# Patient Record
Sex: MALE | Race: WHITE | Employment: FULL TIME | URBAN - METROPOLITAN AREA
[De-identification: names, ages, dates, MRNs, and addresses within clinical notes are randomized per-mention and may not be internally consistent; named-entity substitution may affect disease eponyms.]

---

## 2022-12-20 ENCOUNTER — HOSPITAL ENCOUNTER (EMERGENCY)
Age: 37
Discharge: HOME | End: 2022-12-20
Attending: EMERGENCY MEDICINE
Payer: COMMERCIAL

## 2022-12-20 ENCOUNTER — APPOINTMENT (OUTPATIENT)
Dept: GENERAL RADIOLOGY | Age: 37
End: 2022-12-20
Payer: COMMERCIAL

## 2022-12-20 VITALS
HEIGHT: 70 IN | DIASTOLIC BLOOD PRESSURE: 93 MMHG | SYSTOLIC BLOOD PRESSURE: 131 MMHG | HEART RATE: 69 BPM | WEIGHT: 185 LBS | OXYGEN SATURATION: 96 % | BODY MASS INDEX: 26.48 KG/M2 | TEMPERATURE: 98.3 F | RESPIRATION RATE: 13 BRPM

## 2022-12-20 DIAGNOSIS — R07.89 ATYPICAL CHEST PAIN: Primary | ICD-10-CM

## 2022-12-20 DIAGNOSIS — R10.13 DYSPEPSIA: ICD-10-CM

## 2022-12-20 LAB
A/G RATIO: 1.6 (ref 1.1–2.2)
ALBUMIN SERPL-MCNC: 4.7 G/DL (ref 3.4–5)
ALP BLD-CCNC: 75 U/L (ref 40–129)
ALT SERPL-CCNC: 26 U/L (ref 10–40)
ANION GAP SERPL CALCULATED.3IONS-SCNC: 9 MMOL/L (ref 3–16)
AST SERPL-CCNC: 18 U/L (ref 15–37)
BASOPHILS ABSOLUTE: 0 K/UL (ref 0–0.2)
BASOPHILS RELATIVE PERCENT: 0.5 %
BILIRUB SERPL-MCNC: 0.5 MG/DL (ref 0–1)
BUN BLDV-MCNC: 13 MG/DL (ref 7–20)
CALCIUM SERPL-MCNC: 10 MG/DL (ref 8.3–10.6)
CHLORIDE BLD-SCNC: 98 MMOL/L (ref 99–110)
CO2: 29 MMOL/L (ref 21–32)
CREAT SERPL-MCNC: 1.1 MG/DL (ref 0.9–1.3)
EKG ATRIAL RATE: 63 BPM
EKG DIAGNOSIS: NORMAL
EKG P AXIS: 39 DEGREES
EKG P-R INTERVAL: 178 MS
EKG Q-T INTERVAL: 382 MS
EKG QRS DURATION: 88 MS
EKG QTC CALCULATION (BAZETT): 390 MS
EKG R AXIS: 32 DEGREES
EKG T AXIS: 21 DEGREES
EKG VENTRICULAR RATE: 63 BPM
EOSINOPHILS ABSOLUTE: 0.1 K/UL (ref 0–0.6)
EOSINOPHILS RELATIVE PERCENT: 1.5 %
GFR SERPL CREATININE-BSD FRML MDRD: >60 ML/MIN/{1.73_M2}
GLUCOSE BLD-MCNC: 103 MG/DL (ref 70–99)
HCT VFR BLD CALC: 42.4 % (ref 40.5–52.5)
HEMOGLOBIN: 14.4 G/DL (ref 13.5–17.5)
LYMPHOCYTES ABSOLUTE: 2.7 K/UL (ref 1–5.1)
LYMPHOCYTES RELATIVE PERCENT: 30.8 %
MCH RBC QN AUTO: 29.5 PG (ref 26–34)
MCHC RBC AUTO-ENTMCNC: 34 G/DL (ref 31–36)
MCV RBC AUTO: 86.6 FL (ref 80–100)
MONOCYTES ABSOLUTE: 0.7 K/UL (ref 0–1.3)
MONOCYTES RELATIVE PERCENT: 7.5 %
NEUTROPHILS ABSOLUTE: 5.2 K/UL (ref 1.7–7.7)
NEUTROPHILS RELATIVE PERCENT: 59.7 %
PDW BLD-RTO: 13 % (ref 12.4–15.4)
PLATELET # BLD: 246 K/UL (ref 135–450)
PMV BLD AUTO: 8.4 FL (ref 5–10.5)
POTASSIUM REFLEX MAGNESIUM: 4 MMOL/L (ref 3.5–5.1)
RBC # BLD: 4.9 M/UL (ref 4.2–5.9)
SODIUM BLD-SCNC: 136 MMOL/L (ref 136–145)
TOTAL PROTEIN: 7.7 G/DL (ref 6.4–8.2)
TROPONIN: <0.01 NG/ML
WBC # BLD: 8.7 K/UL (ref 4–11)

## 2022-12-20 PROCEDURE — 99285 EMERGENCY DEPT VISIT HI MDM: CPT

## 2022-12-20 PROCEDURE — 93010 ELECTROCARDIOGRAM REPORT: CPT | Performed by: INTERNAL MEDICINE

## 2022-12-20 PROCEDURE — 84484 ASSAY OF TROPONIN QUANT: CPT

## 2022-12-20 PROCEDURE — 6370000000 HC RX 637 (ALT 250 FOR IP): Performed by: EMERGENCY MEDICINE

## 2022-12-20 PROCEDURE — 85025 COMPLETE CBC W/AUTO DIFF WBC: CPT

## 2022-12-20 PROCEDURE — 93005 ELECTROCARDIOGRAM TRACING: CPT | Performed by: EMERGENCY MEDICINE

## 2022-12-20 PROCEDURE — 71045 X-RAY EXAM CHEST 1 VIEW: CPT

## 2022-12-20 PROCEDURE — 80053 COMPREHEN METABOLIC PANEL: CPT

## 2022-12-20 RX ORDER — SUCRALFATE 1 G/1
1 TABLET ORAL 4 TIMES DAILY
Qty: 60 TABLET | Refills: 3 | Status: SHIPPED | OUTPATIENT
Start: 2022-12-20

## 2022-12-20 RX ORDER — FAMOTIDINE 20 MG/1
20 TABLET, FILM COATED ORAL ONCE
Status: COMPLETED | OUTPATIENT
Start: 2022-12-20 | End: 2022-12-20

## 2022-12-20 RX ADMIN — ALUMINUM HYDROXIDE, MAGNESIUM HYDROXIDE, AND SIMETHICONE: 200; 200; 20 SUSPENSION ORAL at 02:05

## 2022-12-20 RX ADMIN — FAMOTIDINE 20 MG: 20 TABLET ORAL at 02:05

## 2022-12-20 ASSESSMENT — ENCOUNTER SYMPTOMS
WHEEZING: 0
BACK PAIN: 0
NAUSEA: 0
RHINORRHEA: 0
ABDOMINAL PAIN: 0
SHORTNESS OF BREATH: 0
VOMITING: 0
DIARRHEA: 0
PHOTOPHOBIA: 0
COUGH: 0

## 2022-12-20 ASSESSMENT — PAIN SCALES - GENERAL: PAINLEVEL_OUTOF10: 4

## 2022-12-20 ASSESSMENT — PAIN - FUNCTIONAL ASSESSMENT: PAIN_FUNCTIONAL_ASSESSMENT: 0-10

## 2022-12-20 NOTE — ED NOTES
[id number] Wendy Don for d/c. Stable, ambulatory w/ family and all belongings.  No questions at d/c.      Julie Felty, RN  12/20/22 [id number]

## 2022-12-20 NOTE — ED PROVIDER NOTES
Emergency Department Provider Note  Location: [address] [place] [address]  ED  12/20/2022     Patient Identification  Zackary Valerio is a 30 y.o. male    Chief Complaint  Chest Pain (Generalized CP, 1 wk, tried Prilosec from PCP, still having CP) and Shortness of Breath (1 wk, continuous, 100% on RA)          HPI  (History provided by patient)  Patient is a 51-year-old male with a history of GERD who presents with epigastric and substernal chest discomfort over the past week. Reports it is worse when he lies down and its been waking him up. Normally is controlled with Prilosec and Tums but it has not been over the past week. Reports that he gets odd sensations when he lies down and it woke him up tonight which made him worried. Denies any exertional symptoms any diaphoresis lightheadedness. Denies any black bloody or tarry stools. Reports he was in [place] last week and had excessive alcohol intake. Does take NSAIDs on occasion. No back pain no pleurisy. I have reviewed the following nursing documentation:  Allergies: No Known Allergies    Past medical history:  has a past medical history of GERD (gastroesophageal reflux disease), Irritable bowel syndrome, and Persistent headaches (01/01/2013). Past surgical history:  has no past surgical history on file. Home medications:   Prior to Admission medications    Medication Sig Start Date End Date Taking? Authorizing Provider   sucralfate (CARAFATE) 1 GM tablet Take 1 tablet by mouth 4 times daily 12/20/22  Yes Diana Grayson MD       Social history:  reports that he has never smoked. He has never used smokeless tobacco. He reports current alcohol use. He reports that he does not currently use drugs after having used the following drugs: Marijuana Daril Cheo).     Family history:    Family History   Problem Relation Age of Onset    Hypertension Father 21    Diabetes Maternal Grandmother          ROS  Review of Systems   Constitutional:  Negative for chills and fever. HENT:  Negative for congestion and rhinorrhea. Eyes:  Negative for photophobia and visual disturbance. Respiratory:  Negative for cough, shortness of breath and wheezing. Cardiovascular:  Positive for chest pain. Negative for palpitations. Gastrointestinal:  Negative for abdominal pain, diarrhea, nausea and vomiting. Genitourinary:  Negative for dysuria and hematuria. Musculoskeletal:  Negative for back pain and neck pain. Skin:  Negative for rash and wound. Neurological:  Negative for syncope and weakness. Psychiatric/Behavioral:  Negative for agitation and confusion. Exam  ED Triage Vitals   BP Temp Temp Source Heart Rate Resp SpO2 Height Weight   12/20/22 0034 12/20/22 0034 12/20/22 0029 12/20/22 0029 12/20/22 0029 12/20/22 0029 12/20/22 0029 12/20/22 0029   (!) 151/101 98.3 °F (36.8 °C) Oral 64 16 100 % 5' 10\" (1.778 m) 185 lb (83.9 kg)       Physical Exam  Vitals and nursing note reviewed. Constitutional:       General: He is not in acute distress. Appearance: He is well-developed. HENT:      Head: Normocephalic and atraumatic. Nose: Nose normal. No congestion. Eyes:      General: No scleral icterus. Extraocular Movements: Extraocular movements intact. Cardiovascular:      Rate and Rhythm: Normal rate and regular rhythm. Heart sounds: No murmur heard. Comments: Equal radial pulses  Pulmonary:      Effort: Pulmonary effort is normal.      Breath sounds: Normal breath sounds. Comments: No chest wall tenderness or crepitus  Abdominal:      General: There is no distension. Palpations: Abdomen is soft. Tenderness: There is no abdominal tenderness. Comments: Negative Delgado's   Musculoskeletal:         General: No deformity. Normal range of motion. Cervical back: Normal range of motion and neck supple. Skin:     General: Skin is warm. Findings: No rash.    Neurological:      Mental Status: He is alert and oriented to person, place, and time. Motor: No abnormal muscle tone. Coordination: Coordination normal.   Psychiatric:         Mood and Affect: Mood normal.         Behavior: Behavior normal.         ED Course    ED Medication Orders (From admission, onward)      Start Ordered     Status Ordering Provider    12/20/22 0130 12/20/22 0120  famotidine (PEPCID) tablet 20 mg  ONCE         Last MAR action: Given - by George Byrne on 12/20/22 at [address], [address] [address] [place]    12/20/22 0130 12/20/22 0120  aluminum & magnesium hydroxide-simethicone (MAALOX) 30 mL, lidocaine viscous hcl (XYLOCAINE) 5 mL (GI COCKTAIL)  ONCE         Last MAR action: Given - by George Byrne on 12/20/22 at [address], [place]            EKG  Rhythm is normal sinus  Rate is 60  Axis is normal  Conduction Abnormalities none noted  No STEMI criteria  Qtc is 390      Radiology  XR CHEST PORTABLE    Result Date: 12/20/2022  EXAMINATION: ONE XRAY VIEW OF THE CHEST 12/20/2022 1:29 am COMPARISON: None. HISTORY: ORDERING SYSTEM PROVIDED HISTORY: CP TECHNOLOGIST PROVIDED HISTORY: Reason for exam:->CP Reason for Exam: cp FINDINGS: There is no consolidation, pleural effusion, or pneumothorax. Cardiac silhouette is not enlarged and there is no pulmonary vascular congestion. Mediastinum and kayla are within normal limits. Bony thorax is unremarkable. No acute cardiopulmonary process. Bedside Ultrasound  No results found.        Labs  Results for orders placed or performed during the hospital encounter of 12/20/22   CBC with Auto Differential   Result Value Ref Range    WBC 8.7 4.0 - 11.0 K/uL    RBC 4.90 4.20 - 5.90 M/uL    Hemoglobin 14.4 13.5 - 17.5 g/dL    Hematocrit 42.4 40.5 - 52.5 %    MCV 86.6 80.0 - 100.0 fL    MCH 29.5 26.0 - 34.0 pg    MCHC 34.0 31.0 - 36.0 g/dL    RDW 13.0 12.4 - 15.4 %    Platelets 986 064 - 797 K/uL    MPV 8.4 5.0 - 10.5 fL    Neutrophils % 59.7 %    Lymphocytes % 30.8 %    Monocytes % 7.5 %    Eosinophils % 1.5 %    Basophils % 0.5 %    Neutrophils Absolute 5.2 1.7 - 7.7 K/uL    Lymphocytes Absolute 2.7 1.0 - 5.1 K/uL    Monocytes Absolute 0.7 0.0 - 1.3 K/uL    Eosinophils Absolute 0.1 0.0 - 0.6 K/uL    Basophils Absolute 0.0 0.0 - 0.2 K/uL   Comprehensive Metabolic Panel w/ Reflex to MG   Result Value Ref Range    Sodium 136 136 - 145 mmol/L    Potassium reflex Magnesium 4.0 3.5 - 5.1 mmol/L    Chloride 98 (L) 99 - 110 mmol/L    CO2 29 21 - 32 mmol/L    Anion Gap 9 3 - 16    Glucose 103 (H) 70 - 99 mg/dL    BUN 13 7 - 20 mg/dL    Creatinine 1.1 0.9 - 1.3 mg/dL    Est, Glom Filt Rate >60 >60    Calcium 10.0 8.3 - 10.6 mg/dL    Total Protein 7.7 6.4 - 8.2 g/dL    Albumin 4.7 3.4 - 5.0 g/dL    Albumin/Globulin Ratio 1.6 1.1 - 2.2    Total Bilirubin 0.5 0.0 - 1.0 mg/dL    Alkaline Phosphatase 75 40 - 129 U/L    ALT 26 10 - 40 U/L    AST 18 15 - 37 U/L   Troponin   Result Value Ref Range    Troponin <0.01 <0.01 ng/mL   EKG 12 Lead   Result Value Ref Range    Ventricular Rate 63 BPM    Atrial Rate 63 BPM    P-R Interval 178 ms    QRS Duration 88 ms    Q-T Interval 382 ms    QTc Calculation (Bazett) 390 ms    P Axis 39 degrees    R Axis 32 degrees    T Axis 21 degrees    Diagnosis       Normal sinus rhythmNormal ECGNo previous ECGs available         Procedures  Procedures      MDM  Patient seen and evaluated. Relevant records reviewed. - Patient is 30 y.o. male presented for atypical chest pain consistent with dyspepsia or other gastritis likely. - Exam showed well-appearing male no acute distress vitals overall reassuring. Exam noted above. - Workup here overall reassuring. EKG does not show any acute pattern, troponin negative x1. Chest x-ray without acute process and no significant metabolic derangement. Reassessed the patient after GI cocktail and reports some improvement. Still has some mild symptoms.   I recommended serial troponins and discussed heart score and associated risk of M ACE based on 1 versus 2 troponin, MI overall low concern for ACS. Patient does not wish to stay for repeat heart markers. Discussed appropriate follow-up and return precautions. - I have a low concern for  other emergent process, and do not see indication for further work-up in the ER, as it is unlikely  and poses more risk than benefit. - I discussed the results, including any incidental findings, with patient. Questions answered. We agreed to d/c. Patient/family agreeable to plan and express understanding of plan. Clinical Impression:  1. Atypical chest pain    2. Dyspepsia          Disposition:  Discharge to home in good condition. Blood pressure (!) 135/92, pulse 60, temperature 98.3 °F (36.8 °C), temperature source Oral, resp. rate 13, height 5' 10\" (1.778 m), weight 185 lb (83.9 kg), SpO2 98 %. Patient was given scripts for the following medications. I counseled patient how to take these medications. New Prescriptions    SUCRALFATE (CARAFATE) 1 GM TABLET    Take 1 tablet by mouth 4 times daily       Disposition referral (if applicable): Gloria Carpenter MD  [address] [place] [id number] [phone]          [facility]  [address] [facility]  [phone]    Schedule an appointment as soon as possible for a visit       I, [address], am the primary attending of record and contributed the majority of evaluation and treatment of emergent care for this encounter. Total critical care time is 0 minutes, which excludes separately billable procedures and updating family. Time spent is specifically for management of the presenting complaint and symptoms initially, direct bedside care, reevaluation, review of records, and consultation. There was a high probability of clinically significant life-threatening deterioration in the patient's condition, which required my urgent intervention.      This chart was generated in part by using [employer] and may contain errors related to that system including errors in grammar, punctuation, and spelling, as well as words and phrases that may be inappropriate. If there are any questions or concerns please feel free to contact the dictating provider for clarification.      MD Jada Harper MD  12/20/22 1909

## 2022-12-20 NOTE — DISCHARGE INSTRUCTIONS
We believe your chest pain is caused by reflux or gastritis prescribing Carafate for symptoms continue other medications. Follow-up with a GI doctor: Arrange follow-up appointment. Return to emergency department if you develop any worsening symptoms or other emergent concerns as we discussed.

## 2024-11-07 ENCOUNTER — HOSPITAL ENCOUNTER (OUTPATIENT)
Dept: ULTRASOUND IMAGING | Age: 39
Discharge: HOME | End: 2024-11-07
Payer: COMMERCIAL

## 2024-11-07 DIAGNOSIS — N50.82 SCROTAL PAIN: ICD-10-CM

## 2024-11-07 PROCEDURE — 76870 US EXAM SCROTUM: CPT
